# Patient Record
(demographics unavailable — no encounter records)

---

## 2025-01-09 NOTE — REASON FOR VISIT
[Behavioral Health Urgent Care Assessment] : a behavioral health urgent care assessment [Patient] : patient [Self] : alone [Parents] : with parents

## 2025-01-17 NOTE — RISK ASSESSMENT
[Clinical Interview] : Clinical Interview [Collateral Sources] : Collateral Sources [No] : No [Impulsivity] : impulsivity [History of Impulsivity] : history of impulsivity [Supportive social network of family or friends] : supportive social network of family or friends [Positive therapeutic relationships] : positive therapeutic relationships [Responsibility to children, family, or others] : responsibility to children, family, or others [Fear of death/actual act of killing self] : fear of death or the actual act of killing self [Yes (details below)] : yes [None Known] : none known [Yes, within past 3 months] : yes, within past 3 months [Residential stability] : residential stability [Relationship stability] : relationship stability [Sobriety] : sobriety [FreeTextEntry5] : See above [de-identified] : No access to firearms or weapons. Discussed extensively about firearm safety and that all weapons needs to be put away and locked up so the child does not have access to it. Family demonstrated understanding and agreed to do so.

## 2025-01-17 NOTE — DISCUSSION/SUMMARY
[Low acute suicide risk] : Low acute suicide risk [No] : No [Not clinically indicated] : Safety Plan completed/updated (for individuals at risk): Not clinically indicated [FreeTextEntry1] : Patient has a low risk of suicide due to adamantly denying any suicidal ideation, intent, or plan.  Protective factors include strong family and social support, lack of prior self-harm or suicide attempts, denying any substance use, willingness to engage in treatment and follow-up, active participation in safety planning, future orientation with long and short-term goals, hopeful, help seeking, being engaged in school and having extracurricular activities, lack of history of abuse or trauma, and lack of access to guns or weapons, and no legal history.   Patient has access to firearms or weapons. We discussed extensively about firearm safety and that all weapons needs to be put away and locked up so the child does not have access to it. Family demonstrated understanding and agreed to do so.

## 2025-01-17 NOTE — PHYSICAL EXAM
[Normal] : normal [None] : none [Intermittent] : intermittent [Euthymic] : euthymic [Full] : full [Clear] : clear [[place]] : concrete [WNL] : within normal limits [Average] : average [Difficulty acknowledging presence of psychiatric problems] : Difficulty acknowledging presence of psychiatric problems [Mild] : mild [Positive interaction] : positive interaction [Unremarkable/age appropriate] : unremarkable/age appropriate [FreeTextEntry5] : Patient is distracted with playing on her computer and did not participate much on interview

## 2025-01-17 NOTE — HISTORY OF PRESENT ILLNESS
[FreeTextEntry1] : Andrea Prasad is a 9-year-old female, residing at home with parents and pet dog, fourth grader at [employer], who presents at the referral family due to dysregulated behaviors at home in the setting of autism.  Andrea past psychiatric history notable for autism spectrum disorder and (undiagnosed) ADHD.  No past psychiatric hospitalizations.  1 previous ED presentation in 2022 due to dysregulated behaviors.  No known past suicide attempts or self-injurious behavior.  No significant, chronic past medical history.  The patient presented as calm, cooperative, and engaged, with appropriate affect. She reported an overall good mood and denied any history of suicidal/homicidal ideation, auditory/visual hallucinations, nikolay, psychosis, or non-suicidal self-injurious behaviors. Despite reporting that she likes school and is motivated to return, the patient has been unable to attend since the holiday break, although she could not identify a specific reason for this school refusal. She denied any school-related stressors or social issues contributing to her absence. The patient reported difficulty managing challenging emotions, particularly anger and anxiety, leading to impulsive behaviors such as aggression towards property and emotional dysregulation manifesting as "meltdowns." She described generally doing well academically, enjoying learning, and having friends, denying any bullying or peer-related problems. Family relationships were reported as positive. She endorsed good sleep and appetite. The patient was unable to articulate whether she experiences difficulties with focus or concentration. She confirmed participation in school-based therapy and psychiatric medication management, reporting medication compliance but uncertainty about its effectiveness. While she denied involvement in outside therapy, collateral information from her parents indicates regular appointments with a psychologist. The patient denied any acute safety concerns and expressed willingness to continue with outpatient therapy and psychiatric medication management.  Parents requested today's appointment as a crisis due to patient's recent dysregulated and aggressive behaviors at home over the past 1 to 2 weeks.  She has had 2-3 significant episodes of tantrums and outbursts in which she threw a 3D printer and her nightstand to the ground due to not being able to play more games on her iPad or watch YouTube.  Since the start of the school year last week, she has not been able to go back to school but was not able to name any specific reasons other than her own volition and probably difficulty with transitions.  She has had long text message conversations with her mom expressing her anger, frustration, and has even threatened to elope from home. When she is upset at home, she sometimes speaks in the evil tone which has been concerning and disturbing to parents. No acute safety concerns and no behaviors demonstrating suicidality or homicidality.  Parents mention that recent changes include start of puberty as patient began to develop breast buds, signaling Teofilo stage I.  As such, her hormonal change can also be contributing to her increased irritability and physical aggression.  At school, she is described to be an overall behavioral control with behaviors of hyperactivity and always being "on the go."    Parents also discussed her research on a term/condition called "pathological demand avoidance" which they think applies to their daughter.  From a treatment standpoint, Andrea has been compliant with her medications of guanfacine and Zoloft and has been tolerating these medications well. [FreeTextEntry2] : Current diagnosis: autism spectrum disorder and (undiagnosed, discussed by psychologist) ADHD Current medications: Guanfacine extended release 2 mg at bedtime, Zoloft 100 mg at bedtime, melatonin 2 mg as needed Current provider: Dr. Latonya Camp, PhD; Dr. Mckinley Echavarria  Past medication trials: None Past psychiatry hospitalizations or psychiatric ED presentations: 1 previous ED presentation in 2022 due to dysregulated behaviors.  No past psychiatric hospitalizations Past suicide attempts or self-injurious behavior: Denies Past treatments: Previous ADOS evaluation done at [facility]: Denies Trauma: Denies any physical, emotional, or sexual trauma

## 2025-01-17 NOTE — DISCUSSION/SUMMARY
[FreeTextEntry1] : Andrea Prasad is a 9-year-old female, residing at home with parents and pet dog, fourth grader at [employer], who presents for follow-up appointment.  She initially presented at the referral family due to dysregulated behaviors at home in the setting of autism. Andrea past psychiatric history notable for autism spectrum disorder and (undiagnosed) ADHD. No past psychiatric hospitalizations. 1 previous ED presentation in 2022 due to dysregulated behaviors. No known past suicide attempts or self-injurious behavior. No significant, chronic past medical history.  From her follow-up appointment today, Andrea has tolerated the splitting dose of guanfacine extended release well.  No side effects of lightheadedness, headaches, or dizziness.  No tantrums or significant dysregulated behaviors.  She attended school today which is a positive sign.  Parents continue to consider home-based instructions with the school and Andrea's current outpatient care team.  Plan: -Continue guanfacine extended release to 1 mg twice daily. Evening dose to give 1 hour before bedtime -Continue melatonin 2 mg as needed at bedtime -Continue Zoloft 100 mg at bedtime -Continue care with Dr. Latonya Camp, PhD for therapy and parent management training and Dr. Mckinley Echavarria for medication management (next appointment on 1/21/2025) (collaboration email sent with parent's consent)

## 2025-01-17 NOTE — HISTORY OF PRESENT ILLNESS
[FreeTextEntry1] : Andrea Prasad is a 9-year-old female, residing at home with parents and pet dog, fourth grader at [employer], who presents at the referral family due to dysregulated behaviors at home in the setting of autism.  Andrea past psychiatric history notable for autism spectrum disorder and (undiagnosed) ADHD.  No past psychiatric hospitalizations.  1 previous ED presentation in 2022 due to dysregulated behaviors.  No known past suicide attempts or self-injurious behavior.  No significant, chronic past medical history.  The patient presented as calm, cooperative, and engaged, with appropriate affect. She reported an overall good mood and denied any history of suicidal/homicidal ideation, auditory/visual hallucinations, nikolay, psychosis, or non-suicidal self-injurious behaviors. Despite reporting that she likes school and is motivated to return, the patient has been unable to attend since the holiday break, although she could not identify a specific reason for this school refusal. She denied any school-related stressors or social issues contributing to her absence. The patient reported difficulty managing challenging emotions, particularly anger and anxiety, leading to impulsive behaviors such as aggression towards property and emotional dysregulation manifesting as "meltdowns." She described generally doing well academically, enjoying learning, and having friends, denying any bullying or peer-related problems. Family relationships were reported as positive. She endorsed good sleep and appetite. The patient was unable to articulate whether she experiences difficulties with focus or concentration. She confirmed participation in school-based therapy and psychiatric medication management, reporting medication compliance but uncertainty about its effectiveness. While she denied involvement in outside therapy, collateral information from her parents indicates regular appointments with a psychologist. The patient denied any acute safety concerns and expressed willingness to continue with outpatient therapy and psychiatric medication management.  Parents requested today's appointment as a crisis due to patient's recent dysregulated and aggressive behaviors at home over the past 1 to 2 weeks.  She has had 2-3 significant episodes of tantrums and outbursts in which she threw a 3D printer and her nightstand to the ground due to not being able to play more games on her iPad or watch YouTube.  Since the start of the school year last week, she has not been able to go back to school but was not able to name any specific reasons other than her own volition and probably difficulty with transitions.  She has had long text message conversations with her mom expressing her anger, frustration, and has even threatened to elope from home. When she is upset at home, she sometimes speaks in the evil tone which has been concerning and disturbing to parents. No acute safety concerns and no behaviors demonstrating suicidality or homicidality.  Parents mention that recent changes include start of puberty as patient began to develop breast buds, signaling Teofilo stage I.  As such, her hormonal change can also be contributing to her increased irritability and physical aggression.  At school, she is described to be an overall behavioral control with behaviors of hyperactivity and always being "on the go."    Parents also discussed her research on a term/condition called "pathological demand avoidance" which they think applies to their daughter.  From a treatment standpoint, Andrea has been compliant with her medications of guanfacine and Zoloft and has been tolerating these medications well. [FreeTextEntry2] : Current diagnosis: autism spectrum disorder and (undiagnosed, discussed by psychologist) ADHD Current medications: Guanfacine extended release 2 mg at bedtime, Zoloft 100 mg at bedtime, melatonin 2 mg as needed Current provider: Dr. Latonya Camp, PhD; Dr. Mckinley Echavarria  Past medication trials: None Past psychiatry hospitalizations or psychiatric ED presentations: 1 previous ED presentation in 2022 due to dysregulated behaviors.  No past psychiatric hospitalizations Past suicide attempts or self-injurious behavior: Denies Past treatments: Previous ADOS evaluation done at [facility]: Denies Trauma: Denies any physical, emotional, or sexual trauma

## 2025-01-17 NOTE — RISK ASSESSMENT
[Clinical Interview] : Clinical Interview [Collateral Sources] : Collateral Sources [No] : No [Impulsivity] : impulsivity [History of Impulsivity] : history of impulsivity [Supportive social network of family or friends] : supportive social network of family or friends [Positive therapeutic relationships] : positive therapeutic relationships [Responsibility to children, family, or others] : responsibility to children, family, or others [Fear of death/actual act of killing self] : fear of death or the actual act of killing self [Yes (details below)] : yes [None Known] : none known [Yes, within past 3 months] : yes, within past 3 months [Residential stability] : residential stability [Relationship stability] : relationship stability [Sobriety] : sobriety [FreeTextEntry5] : See above [de-identified] : No access to firearms or weapons. Discussed extensively about firearm safety and that all weapons needs to be put away and locked up so the child does not have access to it. Family demonstrated understanding and agreed to do so.

## 2025-01-17 NOTE — HISTORY OF PRESENT ILLNESS
[FreeTextEntry1] : Andrea Prasad is a 9-year-old female, residing at home with parents and pet dog, fourth grader at [employer], who presents for follow-up appointment.  She initially presented at the referral family due to dysregulated behaviors at home in the setting of autism. Andrea past psychiatric history notable for autism spectrum disorder and (undiagnosed) ADHD. No past psychiatric hospitalizations. 1 previous ED presentation in 2022 due to dysregulated behaviors. No known past suicide attempts or self-injurious behavior. No significant, chronic past medical history.  On interview today, parents provided the majority of the updates.  Since splitting the dose of guanfacine to 1 mg twice daily last week, Andrea has not had any temper outbursts.  She has also been going to bed easier.  She went to school today, for the first time in a while which is a significant improvement.  Parents continue to consider the option of home based instructions.  Writer provided some psychoeducation and deferred the ultimate recommendations to patient's longitudinal therapist and psychiatrist.  No acute safety concerns.  From evaluation on 1/9/2025: The patient presented as calm, cooperative, and engaged, with appropriate affect. She reported an overall good mood and denied any history of suicidal/homicidal ideation, auditory/visual hallucinations, nikolay, psychosis, or non-suicidal self-injurious behaviors. Despite reporting that she likes school and is motivated to return, the patient has been unable to attend since the holiday break, although she could not identify a specific reason for this school refusal. She denied any school-related stressors or social issues contributing to her absence. The patient reported difficulty managing challenging emotions, particularly anger and anxiety, leading to impulsive behaviors such as aggression towards property and emotional dysregulation manifesting as "meltdowns." She described generally doing well academically, enjoying learning, and having friends, denying any bullying or peer-related problems. Family relationships were reported as positive. She endorsed good sleep and appetite. The patient was unable to articulate whether she experiences difficulties with focus or concentration. She confirmed participation in school-based therapy and psychiatric medication management, reporting medication compliance but uncertainty about its effectiveness. While she denied involvement in outside therapy, collateral information from her parents indicates regular appointments with a psychologist. The patient denied any acute safety concerns and expressed willingness to continue with outpatient therapy and psychiatric medication management.  Parents requested today's appointment as a crisis due to patient's recent dysregulated and aggressive behaviors at home over the past 1 to 2 weeks. She has had 2-3 significant episodes of tantrums and outbursts in which she threw a 3D printer and her nightstand to the ground due to not being able to play more games on her iPad or watch YouTube. Since the start of the school year last week, she has not been able to go back to school but was not able to name any specific reasons other than her own volition and probably difficulty with transitions. She has had long text message conversations with her mom expressing her anger, frustration, and has even threatened to elope from home. When she is upset at home, she sometimes speaks in the evil tone which has been concerning and disturbing to parents. No acute safety concerns and no behaviors demonstrating suicidality or homicidality. Parents mention that recent changes include start of puberty as patient began to develop breast buds, signaling Teofilo stage I. As such, her hormonal change can also be contributing to her increased irritability and physical aggression. At school, she is described to be an overall behavioral control with behaviors of hyperactivity and always being "on the go." Parents also discussed her research on a term/condition called "pathological demand avoidance" which they think applies to their daughter. From a treatment standpoint, Andrea has been compliant with her medications of guanfacine and Zoloft and has been tolerating these medications well.   [FreeTextEntry2] : Current diagnosis: autism spectrum disorder and (undiagnosed, discussed by psychologist) ADHD Current medications: Guanfacine extended release 2 mg at bedtime, Zoloft 100 mg at bedtime, melatonin 2 mg as needed Current provider: Dr. Latonya Camp, PhD; Dr. Mckinley Echavarria Past medication trials: None Past psychiatry hospitalizations or psychiatric ED presentations: 1 previous ED presentation in 2022 due to dysregulated behaviors. No past psychiatric hospitalizations Past suicide attempts or self-injurious behavior: Denies Past treatments: Previous ADOS evaluation done at [facility]: Denies Trauma: Denies any physical, emotional, or sexual trauma.